# Patient Record
Sex: FEMALE | Race: WHITE | Employment: UNEMPLOYED | URBAN - METROPOLITAN AREA
[De-identification: names, ages, dates, MRNs, and addresses within clinical notes are randomized per-mention and may not be internally consistent; named-entity substitution may affect disease eponyms.]

---

## 2017-05-07 ENCOUNTER — TELEPHONE (OUTPATIENT)
Dept: FAMILY MEDICINE CLINIC | Age: 67
End: 2017-05-07

## 2017-05-07 DIAGNOSIS — E11.9 TYPE 2 DIABETES MELLITUS WITHOUT COMPLICATION, WITH LONG-TERM CURRENT USE OF INSULIN (HCC): Primary | ICD-10-CM

## 2017-05-07 DIAGNOSIS — Z79.4 TYPE 2 DIABETES MELLITUS WITHOUT COMPLICATION, WITH LONG-TERM CURRENT USE OF INSULIN (HCC): Primary | ICD-10-CM

## 2017-05-07 RX ORDER — INSULIN GLARGINE 100 [IU]/ML
INJECTION, SOLUTION SUBCUTANEOUS
Qty: 3 EACH | Refills: 11 | OUTPATIENT
Start: 2017-05-07

## 2017-05-07 RX ORDER — INSULIN ASPART 100 [IU]/ML
20 INJECTION, SOLUTION INTRAVENOUS; SUBCUTANEOUS
Qty: 12 ML | Refills: 11 | OUTPATIENT
Start: 2017-05-07 | End: 2018-05-07

## 2017-07-09 ENCOUNTER — HOSPITAL ENCOUNTER (EMERGENCY)
Age: 67
Discharge: HOME | End: 2017-07-10
Attending: EMERGENCY MEDICINE
Payer: SELF-PAY

## 2017-07-09 ENCOUNTER — APPOINTMENT (OUTPATIENT)
Dept: GENERAL RADIOLOGY | Age: 67
End: 2017-07-09
Attending: EMERGENCY MEDICINE
Payer: SELF-PAY

## 2017-07-09 VITALS
BODY MASS INDEX: 36.7 KG/M2 | RESPIRATION RATE: 19 BRPM | HEIGHT: 64 IN | HEART RATE: 85 BPM | OXYGEN SATURATION: 97 % | DIASTOLIC BLOOD PRESSURE: 63 MMHG | WEIGHT: 214.95 LBS | TEMPERATURE: 98.6 F | SYSTOLIC BLOOD PRESSURE: 162 MMHG

## 2017-07-09 DIAGNOSIS — R53.83 FATIGUE, UNSPECIFIED TYPE: ICD-10-CM

## 2017-07-09 DIAGNOSIS — I48.91 ATRIAL FIBRILLATION, UNSPECIFIED TYPE (HCC): Primary | ICD-10-CM

## 2017-07-09 LAB
ALBUMIN SERPL BCP-MCNC: 2.9 G/DL (ref 3.5–5)
ALBUMIN/GLOB SERPL: 0.6 {RATIO} (ref 1.1–2.2)
ALP SERPL-CCNC: 95 U/L (ref 45–117)
ALT SERPL-CCNC: 17 U/L (ref 12–78)
ANION GAP BLD CALC-SCNC: 7 MMOL/L (ref 5–15)
APPEARANCE UR: ABNORMAL
AST SERPL W P-5'-P-CCNC: 17 U/L (ref 15–37)
BACTERIA URNS QL MICRO: ABNORMAL /HPF
BASOPHILS # BLD AUTO: 0 K/UL (ref 0–0.1)
BASOPHILS # BLD: 0 % (ref 0–1)
BILIRUB SERPL-MCNC: 0.3 MG/DL (ref 0.2–1)
BILIRUB UR QL: NEGATIVE
BUN SERPL-MCNC: 20 MG/DL (ref 6–20)
BUN/CREAT SERPL: 15 (ref 12–20)
CALCIUM SERPL-MCNC: 9.3 MG/DL (ref 8.5–10.1)
CHLORIDE SERPL-SCNC: 104 MMOL/L (ref 97–108)
CK SERPL-CCNC: 84 U/L (ref 26–192)
CO2 SERPL-SCNC: 27 MMOL/L (ref 21–32)
COLOR UR: ABNORMAL
CREAT SERPL-MCNC: 1.31 MG/DL (ref 0.55–1.02)
EOSINOPHIL # BLD: 0.3 K/UL (ref 0–0.4)
EOSINOPHIL NFR BLD: 3 % (ref 0–7)
EPITH CASTS URNS QL MICRO: ABNORMAL /LPF
ERYTHROCYTE [DISTWIDTH] IN BLOOD BY AUTOMATED COUNT: 15 % (ref 11.5–14.5)
GLOBULIN SER CALC-MCNC: 5 G/DL (ref 2–4)
GLUCOSE BLD STRIP.AUTO-MCNC: 198 MG/DL (ref 65–100)
GLUCOSE SERPL-MCNC: 284 MG/DL (ref 65–100)
GLUCOSE UR STRIP.AUTO-MCNC: 500 MG/DL
HCT VFR BLD AUTO: 41.2 % (ref 35–47)
HGB BLD-MCNC: 13.4 G/DL (ref 11.5–16)
HGB UR QL STRIP: NEGATIVE
KETONES UR QL STRIP.AUTO: ABNORMAL MG/DL
LEUKOCYTE ESTERASE UR QL STRIP.AUTO: ABNORMAL
LYMPHOCYTES # BLD AUTO: 29 % (ref 12–49)
LYMPHOCYTES # BLD: 2.3 K/UL (ref 0.8–3.5)
MAGNESIUM SERPL-MCNC: 1.7 MG/DL (ref 1.6–2.4)
MCH RBC QN AUTO: 26.7 PG (ref 26–34)
MCHC RBC AUTO-ENTMCNC: 32.5 G/DL (ref 30–36.5)
MCV RBC AUTO: 82.1 FL (ref 80–99)
MONOCYTES # BLD: 0.6 K/UL (ref 0–1)
MONOCYTES NFR BLD AUTO: 7 % (ref 5–13)
NEUTS SEG # BLD: 4.9 K/UL (ref 1.8–8)
NEUTS SEG NFR BLD AUTO: 61 % (ref 32–75)
NITRITE UR QL STRIP.AUTO: NEGATIVE
PH UR STRIP: 6 [PH] (ref 5–8)
PLATELET # BLD AUTO: 415 K/UL (ref 150–400)
POTASSIUM SERPL-SCNC: 5 MMOL/L (ref 3.5–5.1)
PROT SERPL-MCNC: 7.9 G/DL (ref 6.4–8.2)
PROT UR STRIP-MCNC: NEGATIVE MG/DL
RBC # BLD AUTO: 5.02 M/UL (ref 3.8–5.2)
RBC #/AREA URNS HPF: ABNORMAL /HPF (ref 0–5)
SERVICE CMNT-IMP: ABNORMAL
SODIUM SERPL-SCNC: 138 MMOL/L (ref 136–145)
SP GR UR REFRACTOMETRY: 1.03 (ref 1–1.03)
TROPONIN I SERPL-MCNC: 0.11 NG/ML
TROPONIN I SERPL-MCNC: 0.12 NG/ML
UA: UC IF INDICATED,UAUC: ABNORMAL
UROBILINOGEN UR QL STRIP.AUTO: 0.2 EU/DL (ref 0.2–1)
WBC # BLD AUTO: 8.1 K/UL (ref 3.6–11)
WBC URNS QL MICRO: ABNORMAL /HPF (ref 0–4)

## 2017-07-09 PROCEDURE — 84484 ASSAY OF TROPONIN QUANT: CPT | Performed by: EMERGENCY MEDICINE

## 2017-07-09 PROCEDURE — 80053 COMPREHEN METABOLIC PANEL: CPT | Performed by: EMERGENCY MEDICINE

## 2017-07-09 PROCEDURE — 93005 ELECTROCARDIOGRAM TRACING: CPT

## 2017-07-09 PROCEDURE — 87086 URINE CULTURE/COLONY COUNT: CPT | Performed by: EMERGENCY MEDICINE

## 2017-07-09 PROCEDURE — 83735 ASSAY OF MAGNESIUM: CPT | Performed by: EMERGENCY MEDICINE

## 2017-07-09 PROCEDURE — 85025 COMPLETE CBC W/AUTO DIFF WBC: CPT | Performed by: EMERGENCY MEDICINE

## 2017-07-09 PROCEDURE — 36415 COLL VENOUS BLD VENIPUNCTURE: CPT | Performed by: EMERGENCY MEDICINE

## 2017-07-09 PROCEDURE — 81001 URINALYSIS AUTO W/SCOPE: CPT | Performed by: EMERGENCY MEDICINE

## 2017-07-09 PROCEDURE — 82550 ASSAY OF CK (CPK): CPT | Performed by: EMERGENCY MEDICINE

## 2017-07-09 PROCEDURE — 99285 EMERGENCY DEPT VISIT HI MDM: CPT

## 2017-07-09 PROCEDURE — 71020 XR CHEST PA LAT: CPT

## 2017-07-09 PROCEDURE — 82962 GLUCOSE BLOOD TEST: CPT

## 2017-07-10 LAB
ATRIAL RATE: 90 BPM
CALCULATED R AXIS, ECG10: 44 DEGREES
CALCULATED T AXIS, ECG11: -177 DEGREES
DIAGNOSIS, 93000: NORMAL
Q-T INTERVAL, ECG07: 356 MS
QRS DURATION, ECG06: 72 MS
QTC CALCULATION (BEZET), ECG08: 426 MS
VENTRICULAR RATE, ECG03: 86 BPM

## 2017-07-10 NOTE — DISCHARGE INSTRUCTIONS
Atrial Fibrillation: Care Instructions  Your Care Instructions    Atrial fibrillation is an irregular and often fast heartbeat. Treating this condition is important for several reasons. It can cause blood clots, which can travel from your heart to your brain and cause a stroke. If you have a fast heartbeat, you may feel lightheaded, dizzy, and weak. An irregular heartbeat can also increase your risk for heart failure. Atrial fibrillation is often the result of another heart condition, such as high blood pressure or coronary artery disease. Making changes to improve your heart condition will help you stay healthy and active. Follow-up care is a key part of your treatment and safety. Be sure to make and go to all appointments, and call your doctor if you are having problems. It's also a good idea to know your test results and keep a list of the medicines you take. How can you care for yourself at home? Medicines  · Take your medicines exactly as prescribed. Call your doctor if you think you are having a problem with your medicine. You will get more details on the specific medicines your doctor prescribes. · If your doctor has given you a blood thinner to prevent a stroke, be sure you get instructions about how to take your medicine safely. Blood thinners can cause serious bleeding problems. · Do not take any vitamins, over-the-counter drugs, or herbal products without talking to your doctor first.  Lifestyle changes  · Do not smoke. Smoking can increase your chance of a stroke and heart attack. If you need help quitting, talk to your doctor about stop-smoking programs and medicines. These can increase your chances of quitting for good. · Eat a heart-healthy diet. · Stay at a healthy weight. Lose weight if you need to. · Limit alcohol to 2 drinks a day for men and 1 drink a day for women. Too much alcohol can cause health problems. · Avoid colds and flu. Get a pneumococcal vaccine shot.  If you have had one before, ask your doctor whether you need another dose. Get a flu shot every year. If you must be around people with colds or flu, wash your hands often. Activity  · If your doctor recommends it, get more exercise. Walking is a good choice. Bit by bit, increase the amount you walk every day. Try for at least 30 minutes on most days of the week. You also may want to swim, bike, or do other activities. Your doctor may suggest that you join a cardiac rehabilitation program so that you can have help increasing your physical activity safely. · Start light exercise if your doctor says it is okay. Even a small amount will help you get stronger, have more energy, and manage stress. Walking is an easy way to get exercise. Start out by walking a little more than you did in the hospital. Gradually increase the amount you walk. · When you exercise, watch for signs that your heart is working too hard. You are pushing too hard if you cannot talk while you are exercising. If you become short of breath or dizzy or have chest pain, sit down and rest immediately. · Check your pulse regularly. Place two fingers on the artery at the palm side of your wrist, in line with your thumb. If your heartbeat seems uneven or fast, talk to your doctor. When should you call for help? Call 911 anytime you think you may need emergency care. For example, call if:  · You have symptoms of a heart attack. These may include:  ¨ Chest pain or pressure, or a strange feeling in the chest.  ¨ Sweating. ¨ Shortness of breath. ¨ Nausea or vomiting. ¨ Pain, pressure, or a strange feeling in the back, neck, jaw, or upper belly or in one or both shoulders or arms. ¨ Lightheadedness or sudden weakness. ¨ A fast or irregular heartbeat. After you call 911, the  may tell you to chew 1 adult-strength or 2 to 4 low-dose aspirin. Wait for an ambulance. Do not try to drive yourself. · You have symptoms of a stroke.  These may include:  ¨ Sudden numbness, tingling, weakness, or loss of movement in your face, arm, or leg, especially on only one side of your body. ¨ Sudden vision changes. ¨ Sudden trouble speaking. ¨ Sudden confusion or trouble understanding simple statements. ¨ Sudden problems with walking or balance. ¨ A sudden, severe headache that is different from past headaches. · You passed out (lost consciousness). Call your doctor now or seek immediate medical care if:  · You have new or increased shortness of breath. · You feel dizzy or lightheaded, or you feel like you may faint. · Your heart rate becomes irregular. · You can feel your heart flutter in your chest or skip heartbeats. Tell your doctor if these symptoms are new or worse. Watch closely for changes in your health, and be sure to contact your doctor if you have any problems. Where can you learn more? Go to http://mega-mana.info/. Enter U020 in the search box to learn more about \"Atrial Fibrillation: Care Instructions. \"  Current as of: September 21, 2016  Content Version: 11.3  © 7616-3376 [employer]. Care instructions adapted under license by [employer] (which disclaims liability or warranty for this information). If you have questions about a medical condition or this instruction, always ask your healthcare professional. [address] any warranty or liability for your use of this information.

## 2017-07-10 NOTE — ED PROVIDER NOTES
HPI Comments: Prashanth Bull is a 74 y.o. female with PMhx significant for HTN and DM who presents ambulatory to the ED c/o an acute onset of generalized fatigue and intermittent \"fluttering\" palpitations x2 days. The pt reports associated sx of generalized weakness, diaphoresis with episodes, right upper back pain, and a dry cough. She describes the palpitations as an irregular heart rhythm not heart racing. She expresses that after having the episode with extreme diaphoresis she called her son to bring her to the ED. The pt denies any recent surgeries, recent travel or being on any hormone therapy. She denies any h/o DVT/PE. She denies any lightheadedness, dizziness, syncope, hemoptysis, SOB, chest pain, abdominal pain, nausea, vomiting, dysuria, or hematuria. PCP: Marium Lima DO      There are no other complaints, changes or physical findings at this time. Written by Cesario Delgadillo ED Scribe, as dictated by Kyle Lopez MD     The history is provided by the patient. A  was used (relative). Past Medical History:   Diagnosis Date    Diabetes ([address]     Hypertension        Past Surgical History:   Procedure Laterality Date    HX GYN      C-Sections x3    HX ORTHOPAEDIC      arm and shoulder         No family history on file. Social History     Social History    Marital status:      Spouse name: N/A    Number of children: N/A    Years of education: N/A     Occupational History    Not on file. Social History Main Topics    Smoking status: Never Smoker    Smokeless tobacco: Not on file    Alcohol use No    Drug use: No    Sexual activity: Not on file     Other Topics Concern    Not on file     Social History Narrative    No narrative on file         ALLERGIES: Review of patient's allergies indicates no known allergies. Review of Systems   Constitutional: Positive for diaphoresis (with episodes of palpitations) and fatigue.  Negative for chills and fever. HENT: Negative for congestion, rhinorrhea and sore throat. Eyes: Negative for pain, discharge and visual disturbance. Respiratory: Positive for cough (dry). Negative for chest tightness, shortness of breath and wheezing.         (-) hemoptysis    Cardiovascular: Positive for palpitations (fluttering). Negative for chest pain and leg swelling. Gastrointestinal: Negative for abdominal pain, constipation, diarrhea, nausea and vomiting. Genitourinary: Negative for dysuria, frequency and hematuria. Musculoskeletal: Positive for back pain (right upper). Negative for arthralgias and myalgias. Skin: Negative for rash. Neurological: Positive for weakness (generalized). Negative for dizziness, syncope, light-headedness and headaches. Psychiatric/Behavioral: Negative. Patient Vitals for the past 12 hrs:   Temp Pulse Resp BP SpO2   07/09/17 2330 - 85 19 162/63 97 %   07/09/17 2130 - 85 - 169/61 98 %   07/09/17 2109 - - - (!) 159/97 98 %   07/09/17 2100 - - - 144/44 98 %   07/09/17 1927 98.6 °F (37 °C) 90 18 146/90 99 %        Physical Exam   Constitutional: She is oriented to person, place, and time. She appears well-developed and well-nourished. No distress. HENT:   Head: Normocephalic and atraumatic. Eyes: EOM are normal. Right eye exhibits no discharge. Left eye exhibits no discharge. No scleral icterus. Neck: Normal range of motion. Neck supple. No tracheal deviation present. Cardiovascular: Normal rate, normal heart sounds and intact distal pulses. An irregularly irregular rhythm present. Exam reveals no gallop and no friction rub. No murmur heard. Pulmonary/Chest: Effort normal and breath sounds normal. No respiratory distress. She has no wheezes. She has no rales. Abdominal: Soft. She exhibits no distension. There is no tenderness. Musculoskeletal: Normal range of motion. She exhibits no edema.    Reproducible tenderness throughout right trapezius   Lymphadenopathy: She has no cervical adenopathy. Neurological: She is alert and oriented to person, place, and time. No focal neuro deficits   Skin: Skin is warm and dry. No rash noted. Psychiatric: She has a normal mood and affect. Nursing note and vitals reviewed. MDM  Number of Diagnoses or Management Options  Atrial fibrillation, unspecified type [facility]): Fatigue, unspecified type:   Diagnosis management comments:     Patient presents to ED with palpitations and fatigue. She is in atrial fibrillation, rate controlled. May be new onset but pt and family do not seem to know full medical history. She is already on Xarelto, so I suspect this is not new onset afib. Labs including CBC, CMP, UA are unremarkable. Troponin . 12 then . 11 - discussed with cardiology, since pt is not symptomatic, can discharge with follow up. No signs or symptoms to suggest ACS, PE (Well's score 0). Rhythm has remained rate controlled throughout ED visit. Pt and family do not have full list of medications, so since rhythm is rate controlled, will not add new medication at this time (unclear if pt is on metoprolol or not). Advised close follow up with cardiology this week and advised family to bring list of pt's medications. Discussed results and follow up plan with patient's son. Provided customary return to ED instructions. Son expressed understanding. Ramandeep Valle MD          Amount and/or Complexity of Data Reviewed  Clinical lab tests: ordered and reviewed  Tests in the radiology section of CPT®: ordered and reviewed  Tests in the medicine section of CPT®: ordered and reviewed  Review and summarize past medical records: yes  Discuss the patient with other providers: yes (Cardiology)  Independent visualization of images, tracings, or specimens: yes    Patient Progress  Patient progress: stable    ED Course       Procedures    EKG interpretation: (Preliminary)  1931  Rhythm: atrial fib; and irregular.  Rate (approx.): 86; Axis: normal; CO interval: normal; QRS interval: normal ; ST/T wave: non specific T-wave abnormalities; Other findings: abnormal ekg. CONSULT NOTE:  9:43 PM  Marva To MD spoke with Dr. Ajith Ramos,  Specialty: Cardiology  Discussed pt's hx, disposition, and available diagnostic and imaging results. Reviewed care plans. Consultant recommends repeating the troponin. If there is no significant change she can be discharged with instructions to continue the Metoprolol and add Eliquis or Xarelto. If the pt's troponin is elevated she should be admitted to the hospitalist.   Written by Tod Greenwood. Elie, ED Scribe, as dictated by Marva To MD    PROGRESS NOTE:  11:44 PM  Pt has been re-evaluated. Pt is already on Xarelto, and it is unclear if she is on Metoprolol; pt and son do not know full list of medication. Pt is rate controlled so will not add additional medications at this time. Advised close follow up with cardiology this week for further evaluation. Written by Valentino Kitchen Barrett, ED Scribe, as dictated by Marva To MD.     LABORATORY TESTS:  Recent Results (from the past 12 hour(s))   GLUCOSE, POC    Collection Time: 07/09/17  7:27 PM   Result Value Ref Range    Glucose (POC) 198 (H) 65 - 100 mg/dL    Performed by Patricia Chao    EKG, 12 LEAD, INITIAL    Collection Time: 07/09/17  7:31 PM   Result Value Ref Range    Ventricular Rate 86 BPM    Atrial Rate 90 BPM    QRS Duration 72 ms    Q-T Interval 356 ms    QTC Calculation (Bezet) 426 ms    Calculated R Axis 44 degrees    Calculated T Axis -177 degrees    Diagnosis       Atrial fibrillation  Low voltage QRS  Nonspecific T wave abnormality  Abnormal ECG  When compared with ECG of 19-NOV-2013 20:17,  Atrial fibrillation has replaced Sinus rhythm  Nonspecific T wave abnormality, worse in Inferior leads  Nonspecific T wave abnormality now evident in Lateral leads     CBC WITH AUTOMATED DIFF    Collection Time: 07/09/17  8:33 PM Result Value Ref Range    WBC 8.1 3.6 - 11.0 K/uL    RBC 5.02 3.80 - 5.20 M/uL    HGB 13.4 11.5 - 16.0 g/dL    HCT 41.2 35.0 - 47.0 %    MCV 82.1 80.0 - 99.0 FL    MCH 26.7 26.0 - 34.0 PG    MCHC 32.5 30.0 - 36.5 g/dL    RDW 15.0 (H) 11.5 - 14.5 %    PLATELET 881 (H) 523 - 400 K/uL    NEUTROPHILS 61 32 - 75 %    LYMPHOCYTES 29 12 - 49 %    MONOCYTES 7 5 - 13 %    EOSINOPHILS 3 0 - 7 %    BASOPHILS 0 0 - 1 %    ABS. NEUTROPHILS 4.9 1.8 - 8.0 K/UL    ABS. LYMPHOCYTES 2.3 0.8 - 3.5 K/UL    ABS. MONOCYTES 0.6 0.0 - 1.0 K/UL    ABS. EOSINOPHILS 0.3 0.0 - 0.4 K/UL    ABS. BASOPHILS 0.0 0.0 - 0.1 K/UL   METABOLIC PANEL, COMPREHENSIVE    Collection Time: 07/09/17  8:33 PM   Result Value Ref Range    Sodium 138 136 - 145 mmol/L    Potassium 5.0 3.5 - 5.1 mmol/L    Chloride 104 97 - 108 mmol/L    CO2 27 21 - 32 mmol/L    Anion gap 7 5 - 15 mmol/L    Glucose 284 (H) 65 - 100 mg/dL    BUN 20 6 - 20 MG/DL    Creatinine 1.31 (H) 0.55 - 1.02 MG/DL    BUN/Creatinine ratio 15 12 - 20      GFR est AA 49 (L) >60 ml/min/1.73m2    GFR est non-AA 41 (L) >60 ml/min/1.73m2    Calcium 9.3 8.5 - 10.1 MG/DL    Bilirubin, total 0.3 0.2 - 1.0 MG/DL    ALT (SGPT) 17 12 - 78 U/L    AST (SGOT) 17 15 - 37 U/L    Alk.  phosphatase 95 45 - 117 U/L    Protein, total 7.9 6.4 - 8.2 g/dL    Albumin 2.9 (L) 3.5 - 5.0 g/dL    Globulin 5.0 (H) 2.0 - 4.0 g/dL    A-G Ratio 0.6 (L) 1.1 - 2.2     CK W/ REFLX CKMB    Collection Time: 07/09/17  8:33 PM   Result Value Ref Range    CK 84 26 - 192 U/L   TROPONIN I    Collection Time: 07/09/17  8:33 PM   Result Value Ref Range    Troponin-I, Qt. 0.12 (H) <0.05 ng/mL   MAGNESIUM    Collection Time: 07/09/17  8:33 PM   Result Value Ref Range    Magnesium 1.7 1.6 - 2.4 mg/dL   URINALYSIS W/ REFLEX CULTURE    Collection Time: 07/09/17  9:29 PM   Result Value Ref Range    Color YELLOW/STRAW      Appearance CLOUDY (A) CLEAR      Specific gravity 1.026 1.003 - 1.030      pH (UA) 6.0 5.0 - 8.0      Protein NEGATIVE  NEG mg/dL    Glucose 500 (A) NEG mg/dL    Ketone TRACE (A) NEG mg/dL    Bilirubin NEGATIVE  NEG      Blood NEGATIVE  NEG      Urobilinogen 0.2 0.2 - 1.0 EU/dL    Nitrites NEGATIVE  NEG      Leukocyte Esterase SMALL (A) NEG      WBC 5-10 0 - 4 /hpf    RBC 5-10 0 - 5 /hpf    Epithelial cells MODERATE (A) FEW /lpf    Bacteria 1+ (A) NEG /hpf    UA:UC IF INDICATED URINE CULTURE ORDERED (A) CNI     TROPONIN I    Collection Time: 07/09/17 10:58 PM   Result Value Ref Range    Troponin-I, Qt. 0.11 (H) <0.05 ng/mL       IMAGING RESULTS:  XR CHEST PA LAT   Final Result   INDICATION: Fatigue and palpitations for 2 days     COMPARISON: November 19, 2013     FINDINGS: PA and lateral views of the chest demonstrate a stable  cardiomediastinal silhouette and clear lungs bilaterally. Degenerative changes  are seen throughout the thoracic spine.     IMPRESSION  IMPRESSION: No acute process. IMPRESSION:  1. Atrial fibrillation, unspecified type ([address]    2. Fatigue, unspecified type        PLAN:  1. Current Discharge Medication List        2. Follow-up Information     Follow up With Details Comments [address], [place]  Please follow up with cardiology as soon as possible [address] [place] [address]  [phone]      [facility] EMERGENCY DEPT  As needed, If symptoms worsen [address] [place] [address]  [phone]        3. Return to ED if worse   Discharge Note:  11:44 PM  The patient is ready for discharge. The patient's signs, symptoms, diagnosis, and discharge instruction have been discussed and the patient has conveyed their understanding. The patient is to follow up as recommended or return to the ER should their symptoms worsen. Plan has been discussed and the patient is in agreement. Written by Andrew Delgadillo ED Scribe, as dictated by Amelia Chaney MD     Attestation: This note is prepared by Ty Delgadillo, acting as Scribe for Amelia Chaney MD.      Felicitas Miller MD: The scribe's documentation has been prepared under my direction and personally reviewed by me in its entirety. I confirm that the note above accurately reflects all work, treatment, procedures, and medical decision making performed by me.

## 2017-07-10 NOTE — ED NOTES
MD at bedside. Son at bedside interpreting for pt. Pt. Feels fatigued. Denies lightheaded/dizziness.

## 2017-07-10 NOTE — ED NOTES
DC instructions and education provided by Dr. Jessica Trinidad. Son verbalizes understanding. (pt doesn't speak english.) Son transported pt home.

## 2017-07-11 DIAGNOSIS — I48.0 PAF (PAROXYSMAL ATRIAL FIBRILLATION) (HCC): Primary | ICD-10-CM

## 2017-07-11 LAB
BACTERIA SPEC CULT: NORMAL
CC UR VC: NORMAL
SERVICE CMNT-IMP: NORMAL

## 2023-05-16 RX ORDER — METOPROLOL TARTRATE 50 MG/1
25 TABLET, FILM COATED ORAL 2 TIMES DAILY
COMMUNITY

## 2023-05-16 RX ORDER — ASPIRIN 81 MG/1
TABLET ORAL DAILY
COMMUNITY

## 2023-05-16 RX ORDER — ATORVASTATIN CALCIUM 20 MG/1
TABLET, FILM COATED ORAL DAILY
COMMUNITY

## 2023-05-16 RX ORDER — FOLIC ACID 1 MG/1
TABLET ORAL DAILY
COMMUNITY

## 2023-05-16 RX ORDER — PREDNISONE 10 MG/1
TABLET ORAL
COMMUNITY
Start: 2013-11-25

## 2023-05-16 RX ORDER — INSULIN GLARGINE 100 [IU]/ML
INJECTION, SOLUTION SUBCUTANEOUS
COMMUNITY
Start: 2017-05-07

## 2023-05-16 RX ORDER — GUAIFENESIN AND CODEINE PHOSPHATE 100; 10 MG/5ML; MG/5ML
5 SOLUTION ORAL 3 TIMES DAILY PRN
COMMUNITY
Start: 2013-11-20

## 2023-05-16 RX ORDER — GUAIFENESIN 600 MG/1
600 TABLET, EXTENDED RELEASE ORAL 2 TIMES DAILY
COMMUNITY
Start: 2013-11-25